# Patient Record
Sex: MALE | Race: BLACK OR AFRICAN AMERICAN | NOT HISPANIC OR LATINO | URBAN - METROPOLITAN AREA
[De-identification: names, ages, dates, MRNs, and addresses within clinical notes are randomized per-mention and may not be internally consistent; named-entity substitution may affect disease eponyms.]

---

## 2017-11-03 ENCOUNTER — EMERGENCY (EMERGENCY)
Facility: HOSPITAL | Age: 62
LOS: 1 days | Discharge: HOME | End: 2017-11-03
Admitting: EMERGENCY MEDICINE
Payer: COMMERCIAL

## 2017-11-03 VITALS
OXYGEN SATURATION: 97 % | TEMPERATURE: 99 F | WEIGHT: 179.9 LBS | HEIGHT: 70 IN | HEART RATE: 90 BPM | DIASTOLIC BLOOD PRESSURE: 94 MMHG | RESPIRATION RATE: 18 BRPM | SYSTOLIC BLOOD PRESSURE: 142 MMHG

## 2017-11-03 DIAGNOSIS — Z98.89 OTHER SPECIFIED POSTPROCEDURAL STATES: Chronic | ICD-10-CM

## 2017-11-03 LAB
ALBUMIN SERPL ELPH-MCNC: 4.8 G/DL — SIGNIFICANT CHANGE UP (ref 3.3–5)
ALP SERPL-CCNC: 63 U/L — SIGNIFICANT CHANGE UP (ref 40–120)
ALT FLD-CCNC: 22 U/L — SIGNIFICANT CHANGE UP (ref 10–45)
ANION GAP SERPL CALC-SCNC: 15 MMOL/L — SIGNIFICANT CHANGE UP (ref 5–17)
ANION GAP SERPL CALC-SCNC: 16 MMOL/L — SIGNIFICANT CHANGE UP (ref 5–17)
APPEARANCE UR: CLEAR — SIGNIFICANT CHANGE UP
APTT BLD: 37.6 SEC — HIGH (ref 27.5–37.4)
AST SERPL-CCNC: 40 U/L — SIGNIFICANT CHANGE UP (ref 10–40)
BASOPHILS NFR BLD AUTO: 0.7 % — SIGNIFICANT CHANGE UP (ref 0–2)
BILIRUB SERPL-MCNC: 0.4 MG/DL — SIGNIFICANT CHANGE UP (ref 0.2–1.2)
BILIRUB UR-MCNC: NEGATIVE — SIGNIFICANT CHANGE UP
BUN SERPL-MCNC: 4 MG/DL — LOW (ref 7–23)
BUN SERPL-MCNC: 4 MG/DL — LOW (ref 7–23)
CALCIUM SERPL-MCNC: 9.6 MG/DL — SIGNIFICANT CHANGE UP (ref 8.4–10.5)
CALCIUM SERPL-MCNC: 9.7 MG/DL — SIGNIFICANT CHANGE UP (ref 8.4–10.5)
CHLORIDE SERPL-SCNC: 85 MMOL/L — LOW (ref 96–108)
CHLORIDE SERPL-SCNC: 86 MMOL/L — LOW (ref 96–108)
CHLORIDE UR-SCNC: 20 MMOL/L — SIGNIFICANT CHANGE UP
CO2 SERPL-SCNC: 24 MMOL/L — SIGNIFICANT CHANGE UP (ref 22–31)
CO2 SERPL-SCNC: 25 MMOL/L — SIGNIFICANT CHANGE UP (ref 22–31)
COLOR SPEC: YELLOW — SIGNIFICANT CHANGE UP
CREAT ?TM UR-MCNC: 21 MG/DL — SIGNIFICANT CHANGE UP
CREAT SERPL-MCNC: 1.07 MG/DL — SIGNIFICANT CHANGE UP (ref 0.5–1.3)
CREAT SERPL-MCNC: 1.08 MG/DL — SIGNIFICANT CHANGE UP (ref 0.5–1.3)
DIFF PNL FLD: (no result)
EOSINOPHIL NFR BLD AUTO: 4.5 % — SIGNIFICANT CHANGE UP (ref 0–6)
GLUCOSE SERPL-MCNC: 103 MG/DL — HIGH (ref 70–99)
GLUCOSE SERPL-MCNC: 71 MG/DL — SIGNIFICANT CHANGE UP (ref 70–99)
GLUCOSE UR QL: NEGATIVE — SIGNIFICANT CHANGE UP
HCT VFR BLD CALC: 34.2 % — LOW (ref 39–50)
HGB BLD-MCNC: 12.5 G/DL — LOW (ref 13–17)
HIV 1+2 AB+HIV1 P24 AG SERPL QL IA: SIGNIFICANT CHANGE UP
INR BLD: 1.07 — SIGNIFICANT CHANGE UP (ref 0.88–1.16)
KETONES UR-MCNC: NEGATIVE — SIGNIFICANT CHANGE UP
LEUKOCYTE ESTERASE UR-ACNC: NEGATIVE — SIGNIFICANT CHANGE UP
LYMPHOCYTES # BLD AUTO: 15.2 % — SIGNIFICANT CHANGE UP (ref 13–44)
MCHC RBC-ENTMCNC: 30 PG — SIGNIFICANT CHANGE UP (ref 27–34)
MCHC RBC-ENTMCNC: 36.5 G/DL — HIGH (ref 32–36)
MCV RBC AUTO: 82.2 FL — SIGNIFICANT CHANGE UP (ref 80–100)
MONOCYTES NFR BLD AUTO: 11.9 % — SIGNIFICANT CHANGE UP (ref 2–14)
NEUTROPHILS NFR BLD AUTO: 67.7 % — SIGNIFICANT CHANGE UP (ref 43–77)
NITRITE UR-MCNC: NEGATIVE — SIGNIFICANT CHANGE UP
OSMOLALITY SERPL: 263 MOSM/KG — LOW (ref 280–301)
OSMOLALITY UR: 42 MOSMOL/KG — LOW (ref 100–650)
PH UR: 6 — SIGNIFICANT CHANGE UP (ref 5–8)
PLATELET # BLD AUTO: 299 K/UL — SIGNIFICANT CHANGE UP (ref 150–400)
POTASSIUM SERPL-MCNC: 4.1 MMOL/L — SIGNIFICANT CHANGE UP (ref 3.5–5.3)
POTASSIUM SERPL-MCNC: 4.7 MMOL/L — SIGNIFICANT CHANGE UP (ref 3.5–5.3)
POTASSIUM SERPL-SCNC: 4.1 MMOL/L — SIGNIFICANT CHANGE UP (ref 3.5–5.3)
POTASSIUM SERPL-SCNC: 4.7 MMOL/L — SIGNIFICANT CHANGE UP (ref 3.5–5.3)
PROT SERPL-MCNC: 8.7 G/DL — HIGH (ref 6–8.3)
PROT UR-MCNC: NEGATIVE MG/DL — SIGNIFICANT CHANGE UP
PROTHROM AB SERPL-ACNC: 11.9 SEC — SIGNIFICANT CHANGE UP (ref 9.8–12.7)
RBC # BLD: 4.16 M/UL — LOW (ref 4.2–5.8)
RBC # FLD: 14.6 % — SIGNIFICANT CHANGE UP (ref 10.3–16.9)
SODIUM SERPL-SCNC: 125 MMOL/L — LOW (ref 135–145)
SODIUM SERPL-SCNC: 126 MMOL/L — LOW (ref 135–145)
SODIUM UR-SCNC: 20 MMOL/L — SIGNIFICANT CHANGE UP
SP GR SPEC: <=1.005 — SIGNIFICANT CHANGE UP (ref 1–1.03)
UROBILINOGEN FLD QL: 0.2 E.U./DL — SIGNIFICANT CHANGE UP
WBC # BLD: 5.7 K/UL — SIGNIFICANT CHANGE UP (ref 3.8–10.5)
WBC # FLD AUTO: 5.7 K/UL — SIGNIFICANT CHANGE UP (ref 3.8–10.5)

## 2017-11-03 PROCEDURE — 80053 COMPREHEN METABOLIC PANEL: CPT

## 2017-11-03 PROCEDURE — 85730 THROMBOPLASTIN TIME PARTIAL: CPT

## 2017-11-03 PROCEDURE — 85025 COMPLETE CBC W/AUTO DIFF WBC: CPT

## 2017-11-03 PROCEDURE — 99284 EMERGENCY DEPT VISIT MOD MDM: CPT | Mod: 25

## 2017-11-03 PROCEDURE — 87389 HIV-1 AG W/HIV-1&-2 AB AG IA: CPT

## 2017-11-03 PROCEDURE — 83935 ASSAY OF URINE OSMOLALITY: CPT

## 2017-11-03 PROCEDURE — 87070 CULTURE OTHR SPECIMN AEROBIC: CPT

## 2017-11-03 PROCEDURE — 81001 URINALYSIS AUTO W/SCOPE: CPT

## 2017-11-03 PROCEDURE — 36415 COLL VENOUS BLD VENIPUNCTURE: CPT

## 2017-11-03 PROCEDURE — 87186 SC STD MICRODIL/AGAR DIL: CPT

## 2017-11-03 PROCEDURE — 82570 ASSAY OF URINE CREATININE: CPT

## 2017-11-03 PROCEDURE — 80048 BASIC METABOLIC PNL TOTAL CA: CPT

## 2017-11-03 PROCEDURE — 83930 ASSAY OF BLOOD OSMOLALITY: CPT

## 2017-11-03 PROCEDURE — 82436 ASSAY OF URINE CHLORIDE: CPT

## 2017-11-03 PROCEDURE — 84300 ASSAY OF URINE SODIUM: CPT

## 2017-11-03 PROCEDURE — 99284 EMERGENCY DEPT VISIT MOD MDM: CPT

## 2017-11-03 PROCEDURE — 85610 PROTHROMBIN TIME: CPT

## 2017-11-03 RX ORDER — AZTREONAM 2 G
1 VIAL (EA) INJECTION
Qty: 14 | Refills: 0 | OUTPATIENT
Start: 2017-11-03 | End: 2017-11-10

## 2017-11-03 NOTE — ED PROVIDER NOTE - PROGRESS NOTE DETAILS
d/w ENT, will come to ED to evaluate ENT evaluating pt now.  Serum Na 125 noted on BMP; discussed with patient.  He says he drinks 8-10 quarts of water daily "to stay well hydrated."  He is asymptomatic (aside from his facial cyst), appears euvolemic, is alert and oriented, and has a nonfocal neuro exam.  DTR are symmetric +2 in biceps, BR, and quadriceps.  Advised that he is probably drinking too much water and needs to cut back to 1- 1.5 L of water daily.  Will help to arrange close follow up with primary care. ED Referral Coordinator trying to arrange close primary care follow up. ED Referral Coordinator arranged primary care appointment for patient with Dr Sesar Call ([facility]) tomorrow at 11 am.  Patient states he will go to this appointment.  I explained at length my concern about his hyponatremia; I discussed the need to limit his free water intake to 1.5 liters/day (which he thinks is too little, but reluctantly agreed to try); I also explained the need to go to the nearest ER if he develops any headaches, weakness, confusion, seizures, or any other new/unusual symptoms.  He expressed understanding with read-back to me.

## 2017-11-03 NOTE — ED PROVIDER NOTE - PHYSICAL EXAMINATION
CONSTITUTIONAL: WD,WN. NAD.    SKIN: Normal color and turgor. No rash.    HEAD: NC/AT.  EYES: Conjunctiva clear. EOMI. PERRL.    ENT: Airway patent, OP clear. Nasal mucosa clear, no rhinorrhea. 2 x 3 cm irregular, mildly tender soft tissue mass with fluctuance anterior to the right earlobe.  No erythema or warmth.  No trismus.  No tenderness of tragus.  No mastoid swelling or tenderness.  No swelling of EAC. TM clear.    RESPIRATORY:  Breathing non-labored. No retractions or accessory muscle use.  Lungs CTA bilat.  CARDIOVASCULAR:  RRR, S1S2. No M/R/G.      GI:  Abdomen soft, nontender.    MSK: Neck supple with painless ROM.  No extremity edema or tenderness.  No joint swelling or ROM limitation.  NEURO: Alert and oriented; AZUL with normal strength.  Normal speech and gait.

## 2017-11-03 NOTE — CONSULT NOTE ADULT - SUBJECTIVE AND OBJECTIVE BOX
HPI: 62y Male with no significant past medical history presenting with mass on the right face that has increased in size over the past 6 weeks. States he has always had a dimple in front of both ears, he has had pus drain from the left side on occasion, always self resolves, has not occurred for years. Has never had an issue on the right side until recently, over the past 5-6 weeks swelling has increased and mass has become fluid filled. Patient denies pain, fevers, chills.     Allergies    No Known Allergies    Intolerances      MEDICATIONS:  Antiinfectives:       Hematologic/Anticoagulation:      Pain medications/Neuro:      IV fluids:      Endocrine Medications:       All other standing medications:       All other PRN medications:      Vital Signs Last 24 Hrs  T(C): 37.1 (03 Nov 2017 14:14), Max: 37.1 (03 Nov 2017 14:14)  T(F): 98.7 (03 Nov 2017 14:14), Max: 98.7 (03 Nov 2017 14:14)  HR: 90 (03 Nov 2017 14:14) (90 - 90)  BP: 142/94 (03 Nov 2017 14:14) (142/94 - 142/94)  BP(mean): --  RR: 18 (03 Nov 2017 14:14) (18 - 18)  SpO2: 97% (03 Nov 2017 14:14) (97% - 97%)    LABS:  CBC-    11-03    125<L>  |  85<L>  |  4<L>  ----------------------------<  103<H>  4.1   |  24  |  1.08    Ca    9.6      03 Nov 2017 15:35      Coagulation Studies-  PT/INR - ( 03 Nov 2017 15:35 )   PT: 11.9 sec;   INR: 1.07          PTT - ( 03 Nov 2017 15:35 )  PTT:37.6 sec  Endocrine Panel-  --  --  9.6 mg/dL        PHYSICAL EXAM:    Constitutional: Well-developed, well-nourished.  No hoarseness.     Head:  normocephalic, atraumatic.   Ears:  Ear canals both clear.  Tympanic membranes both intact; no effusion or retraction.  OC/OP:  Tonsils present. Floor of mouth, buccal mucosa, lips, hard palate, soft palate, uvula, posterior pharyngeal wall normal.  Mucosa moist.  Neck:  Trachea midline.  Thyroid, parotid and submandibular glands normal.  Lymph:  No cervical adenopathy.  Face: Mass anterior to right ear with central dimple, ~2cm, mobile with fluctuant component superiorly. No overlying skin changes. 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   - Discharge on bactrim and augmentin  - Patient not interested in having cysts removed as they do not bother him, does not have insurance and does not want to pay for follow up appointment. Is being set up with a primary care doctor by the ER who he will follow up with. Can follow up with ENT at [place] if interested.   - Return to the ER if experiencing increased pain, drainage, fever, chills       [facility] at [phone] with any further questions.

## 2017-11-03 NOTE — ED PROVIDER NOTE - CARE PLAN
Principal Discharge DX:	Preauricular cyst Principal Discharge DX:	Hyponatremia with normal extracellular fluid volume  Secondary Diagnosis:	Preauricular cyst

## 2017-11-03 NOTE — ED ADULT NURSE NOTE - OBJECTIVE STATEMENT
states abcess for a couple weeks to right cheek.  +edema noted. no active discharge.  denies fever/chills.

## 2017-11-03 NOTE — ED PROVIDER NOTE - MEDICAL DECISION MAKING DETAILS
right sided preauricular cyst, possible abscess.  no surrounding cellulitis.  ENT to evaluate for possible I&D.

## 2017-11-04 LAB — GRAM STN FLD: SIGNIFICANT CHANGE UP

## 2017-11-05 LAB
-  AMPICILLIN/SULBACTAM: SIGNIFICANT CHANGE UP
-  AMPICILLIN: SIGNIFICANT CHANGE UP
-  CEFAZOLIN: SIGNIFICANT CHANGE UP
-  CEFTRIAXONE: SIGNIFICANT CHANGE UP
-  CIPROFLOXACIN: SIGNIFICANT CHANGE UP
-  GENTAMICIN: SIGNIFICANT CHANGE UP
-  PIPERACILLIN/TAZOBACTAM: SIGNIFICANT CHANGE UP
-  TOBRAMYCIN: SIGNIFICANT CHANGE UP
-  TRIMETHOPRIM/SULFAMETHOXAZOLE: SIGNIFICANT CHANGE UP
CULTURE RESULTS: SIGNIFICANT CHANGE UP
METHOD TYPE: SIGNIFICANT CHANGE UP
ORGANISM # SPEC MICROSCOPIC CNT: SIGNIFICANT CHANGE UP
ORGANISM # SPEC MICROSCOPIC CNT: SIGNIFICANT CHANGE UP
SPECIMEN SOURCE: SIGNIFICANT CHANGE UP

## 2017-11-10 DIAGNOSIS — E87.1 HYPO-OSMOLALITY AND HYPONATREMIA: ICD-10-CM

## 2017-11-10 DIAGNOSIS — R22.0 LOCALIZED SWELLING, MASS AND LUMP, HEAD: ICD-10-CM

## 2017-11-10 DIAGNOSIS — Z79.899 OTHER LONG TERM (CURRENT) DRUG THERAPY: ICD-10-CM

## 2017-11-10 DIAGNOSIS — Z79.2 LONG TERM (CURRENT) USE OF ANTIBIOTICS: ICD-10-CM

## 2017-11-10 DIAGNOSIS — Z79.891 LONG TERM (CURRENT) USE OF OPIATE ANALGESIC: ICD-10-CM

## 2017-11-10 DIAGNOSIS — Q18.1 PREAURICULAR SINUS AND CYST: ICD-10-CM

## 2018-12-26 ENCOUNTER — EMERGENCY (EMERGENCY)
Facility: HOSPITAL | Age: 63
LOS: 1 days | Discharge: HOME | End: 2018-12-26
Attending: EMERGENCY MEDICINE | Admitting: EMERGENCY MEDICINE
Payer: COMMERCIAL

## 2018-12-26 VITALS
HEART RATE: 88 BPM | RESPIRATION RATE: 16 BRPM | TEMPERATURE: 99 F | DIASTOLIC BLOOD PRESSURE: 99 MMHG | SYSTOLIC BLOOD PRESSURE: 174 MMHG | WEIGHT: 179.68 LBS | OXYGEN SATURATION: 99 %

## 2018-12-26 DIAGNOSIS — Z79.2 LONG TERM (CURRENT) USE OF ANTIBIOTICS: ICD-10-CM

## 2018-12-26 DIAGNOSIS — Z79.899 OTHER LONG TERM (CURRENT) DRUG THERAPY: ICD-10-CM

## 2018-12-26 DIAGNOSIS — L02.411 CUTANEOUS ABSCESS OF RIGHT AXILLA: ICD-10-CM

## 2018-12-26 DIAGNOSIS — Z98.89 OTHER SPECIFIED POSTPROCEDURAL STATES: Chronic | ICD-10-CM

## 2018-12-26 DIAGNOSIS — Z79.891 LONG TERM (CURRENT) USE OF OPIATE ANALGESIC: ICD-10-CM

## 2018-12-26 DIAGNOSIS — Z79.1 LONG TERM (CURRENT) USE OF NON-STEROIDAL ANTI-INFLAMMATORIES (NSAID): ICD-10-CM

## 2018-12-26 PROCEDURE — 10061 I&D ABSCESS COMP/MULTIPLE: CPT

## 2018-12-26 PROCEDURE — 99283 EMERGENCY DEPT VISIT LOW MDM: CPT | Mod: 25

## 2018-12-26 RX ORDER — AZTREONAM 2 G
1 VIAL (EA) INJECTION
Qty: 14 | Refills: 0 | OUTPATIENT
Start: 2018-12-26 | End: 2019-01-01

## 2018-12-26 RX ORDER — CEPHALEXIN 500 MG
1 CAPSULE ORAL
Qty: 28 | Refills: 0 | OUTPATIENT
Start: 2018-12-26 | End: 2019-01-01

## 2018-12-26 NOTE — ED PROVIDER NOTE - MEDICAL DECISION MAKING DETAILS
64 yo M with no pmh c/o large bump under his R axilla x 3 days. Pt reports a small bump there for a "long time" but over the past 3 days it became much larger. Associated with pain. 62 yo M with no pmh c/o large bump under his R axilla x 3 days. Pt reports a small bump there for a "long time" but over the past 3 days it became much larger. Associated with pain. just below axilla 8x9 cm indurated abscess with large fluctuant middle. skin prepped and abscess drained.

## 2018-12-26 NOTE — ED PROVIDER NOTE - PHYSICAL EXAMINATION
CONSTITUTIONAL: Well-appearing; well-nourished; in no apparent distress.   HEAD: Normocephalic; atraumatic.   NECK: Supple; non-tender;   CARDIOVASCULAR: Normal S1, S2; no murmurs, rubs, or gallops. Regular rate and rhythm.   RESPIRATORY: Breathing easily; breath sounds clear and equal bilaterally; no wheezes, rhonchi, or rales.  MSK: FROM at all extremities, normal tone   EXT: No cyanosis or edema; N/V intact  SKIN: just below axilla CONSTITUTIONAL: Well-appearing; well-nourished; in no apparent distress.   HEAD: Normocephalic; atraumatic.   NECK: Supple; non-tender;   CARDIOVASCULAR: Normal S1, S2; no murmurs, rubs, or gallops. Regular rate and rhythm.   RESPIRATORY: Breathing easily; breath sounds clear and equal bilaterally; no wheezes, rhonchi, or rales.  MSK: FROM at all extremities, normal tone   EXT: No cyanosis or edema; N/V intact  SKIN: just below axilla 8x9 cm indurated abscess with large fluctuant middle

## 2018-12-26 NOTE — ED ADULT TRIAGE NOTE - CHIEF COMPLAINT QUOTE
Pt c/o R axillary "bump releasing blood and pus" x "few months", he denies fevers or chills , been taking Ibuprofen.

## 2018-12-26 NOTE — ED PROVIDER NOTE - NSFOLLOWUPINSTRUCTIONS_ED_ALL_ED_FT
Return to ED in 2 days to have abscess checked. Return to ED sooner for worsening pain, fever, chills or any other concerning symptoms. Take antibiotics as prescribed.     Abscess    An abscess is an infected area that contains a collection of pus and debris. It can occur in almost any part of the body and occurs when the tissue gets infection. Symptoms include a painful mass that is red, warm, tender that might break open and HAVE drainage. If your health care provider gave you antibiotics make sure to take the full course and do not stop even if feeling better.     SEEK IMMEDIATE MEDICAL CARE IF YOU HAVE ANY OF THE FOLLOWING SYMPTOMS: chills, fever, muscle aches, or red streaking from the area.

## 2018-12-26 NOTE — ED PROVIDER NOTE - OBJECTIVE STATEMENT
62 yo M with no pmh c/o large bump under his R axilla x 3 days. Pt reports a small bump there for a "long time" but over the past 3 days it became much larger. Associated with pain. Pt reports occasionally he would squeeze the small bump and pus would come out but he has never had it drained before. Denies fever, chills, cp, sob, IV drug use.

## 2018-12-26 NOTE — ED PROVIDER NOTE - ATTENDING CONTRIBUTION TO CARE
right axillary abscess without systemic symptoms.  very swollen/fluctuant.  I and d done by pa with significant pus extracted.  to f/u with pmd.  antibiotic given for surrounding cellulitic changes

## 2018-12-26 NOTE — ED ADULT NURSE NOTE - NSIMPLEMENTINTERV_GEN_ALL_ED
Implemented All Universal Safety Interventions:  [place] to call system. Call bell, personal items and telephone within reach. Instruct patient to call for assistance. Room bathroom lighting operational. Non-slip footwear when patient is off stretcher. Physically safe environment: no spills, clutter or unnecessary equipment. Stretcher in lowest position, wheels locked, appropriate side rails in place.

## 2018-12-27 ENCOUNTER — EMERGENCY (EMERGENCY)
Facility: HOSPITAL | Age: 63
LOS: 1 days | Discharge: HOME | End: 2018-12-27
Admitting: EMERGENCY MEDICINE
Payer: COMMERCIAL

## 2018-12-27 VITALS
SYSTOLIC BLOOD PRESSURE: 133 MMHG | OXYGEN SATURATION: 95 % | WEIGHT: 179.9 LBS | RESPIRATION RATE: 15 BRPM | HEART RATE: 77 BPM | TEMPERATURE: 98 F | DIASTOLIC BLOOD PRESSURE: 88 MMHG

## 2018-12-27 DIAGNOSIS — Z79.1 LONG TERM (CURRENT) USE OF NON-STEROIDAL ANTI-INFLAMMATORIES (NSAID): ICD-10-CM

## 2018-12-27 DIAGNOSIS — Z79.2 LONG TERM (CURRENT) USE OF ANTIBIOTICS: ICD-10-CM

## 2018-12-27 DIAGNOSIS — Z79.891 LONG TERM (CURRENT) USE OF OPIATE ANALGESIC: ICD-10-CM

## 2018-12-27 DIAGNOSIS — Z79.899 OTHER LONG TERM (CURRENT) DRUG THERAPY: ICD-10-CM

## 2018-12-27 DIAGNOSIS — Z48.01 ENCOUNTER FOR CHANGE OR REMOVAL OF SURGICAL WOUND DRESSING: ICD-10-CM

## 2018-12-27 DIAGNOSIS — Z98.89 OTHER SPECIFIED POSTPROCEDURAL STATES: Chronic | ICD-10-CM

## 2018-12-27 PROCEDURE — 99283 EMERGENCY DEPT VISIT LOW MDM: CPT

## 2018-12-27 RX ORDER — IBUPROFEN 200 MG
1 TABLET ORAL
Qty: 21 | Refills: 0 | OUTPATIENT
Start: 2018-12-27 | End: 2019-01-02

## 2018-12-27 NOTE — ED PROVIDER NOTE - OBJECTIVE STATEMENT
62 yo male in the ER for a wound check. Pt had abscess under his right armpit yesterday and it was drained and packing placed. Pt concerned that his dressing became soaking wet and decided to get checked today. pt reports he takes abx as prescribed. denies fever or chills.

## 2018-12-27 NOTE — ED PROVIDER NOTE - SKIN, MLM
Skin normal color for race, warm, dry and intact. No evidence of rash.  wound under right axilla- packing in place, no active drainage, tender to palpation, no cellulitis,

## 2018-12-27 NOTE — ED ADULT NURSE NOTE - NSIMPLEMENTINTERV_GEN_ALL_ED
Implemented All Universal Safety Interventions:  [place] to call system. Call bell, personal items and telephone within reach. Instruct patient to call for assistance. Room bathroom lighting operational. Non-slip footwear when patient is off stretcher. Physically safe environment: no spills, clutter or unnecessary equipment. Stretcher in lowest position, wheels locked, appropriate side rails in place.

## 2018-12-27 NOTE — ED PROVIDER NOTE - NSFOLLOWUPINSTRUCTIONS_ED_ALL_ED_FT
Take medications as prescribed and keep the area of incision dry, clean. f/u for wound check tomorrow.

## 2018-12-27 NOTE — ED ADULT NURSE NOTE - OBJECTIVE STATEMENT
I and D packing in place with outer dressing off- area red and tender; client taking antibiotics and OTC for pain -- came here for dressing check and re do

## 2018-12-27 NOTE — ED PROVIDER NOTE - MEDICAL DECISION MAKING DETAILS
64 yo 1 days s/p I&D , had abscess under his right axilla drained here yesterday. afebtrile, on PO Bactrim and Keflex. dressing changed, no active drainage at present, packing in place. f/u in 24 hours recommended.

## 2018-12-28 ENCOUNTER — EMERGENCY (EMERGENCY)
Facility: HOSPITAL | Age: 63
LOS: 1 days | Discharge: HOME | End: 2018-12-28
Admitting: EMERGENCY MEDICINE
Payer: COMMERCIAL

## 2018-12-28 VITALS
TEMPERATURE: 98 F | RESPIRATION RATE: 18 BRPM | SYSTOLIC BLOOD PRESSURE: 147 MMHG | WEIGHT: 179.9 LBS | HEART RATE: 80 BPM | DIASTOLIC BLOOD PRESSURE: 80 MMHG | OXYGEN SATURATION: 98 %

## 2018-12-28 DIAGNOSIS — Z98.89 OTHER SPECIFIED POSTPROCEDURAL STATES: Chronic | ICD-10-CM

## 2018-12-28 PROCEDURE — 99282 EMERGENCY DEPT VISIT SF MDM: CPT

## 2018-12-28 NOTE — ED PROVIDER NOTE - MEDICAL DECISION MAKING DETAILS
64 yo M with no pmh here for wound check. Abscess to R axilla drained 2 days ago. Pt reports pain has improved. Denies fever, chills. Pt taking bactrim and keflex as directed. R axilla- healing abscess, slight drainage. no surrounding erythema or warmth. Dressing changed. Will return in 2 days for a wound check.

## 2018-12-28 NOTE — ED PROVIDER NOTE - OBJECTIVE STATEMENT
64 yo M with no pmh here for wound check. Abscess to R axilla drained 2 days ago. Pt reports pain has improved. Denies fever, chills. Pt taking bactrim and keflex as directed.

## 2018-12-28 NOTE — ED PROVIDER NOTE - NSFOLLOWUPINSTRUCTIONS_ED_ALL_ED_FT
Return to ED in 2 days for a wound check.       Abscess    An abscess is an infected area that contains a collection of pus and debris. It can occur in almost any part of the body and occurs when the tissue gets infection. Symptoms include a painful mass that is red, warm, tender that might break open and HAVE drainage. If your health care provider gave you antibiotics make sure to take the full course and do not stop even if feeling better.     SEEK IMMEDIATE MEDICAL CARE IF YOU HAVE ANY OF THE FOLLOWING SYMPTOMS: chills, fever, muscle aches, or red streaking from the area.

## 2018-12-28 NOTE — ED ADULT NURSE NOTE - NSIMPLEMENTINTERV_GEN_ALL_ED
Implemented All Universal Safety Interventions:  [place] to call system. Call bell, personal items and telephone within reach. Instruct patient to call for assistance. Room bathroom lighting operational. Non-slip footwear when patient is off stretcher. Physically safe environment: no spills, clutter or unnecessary equipment. Stretcher in lowest position, wheels locked, appropriate side rails in place.

## 2018-12-28 NOTE — ED ADULT TRIAGE NOTE - OTHER COMPLAINTS
reports wound was bleeding yesterday, came back for dressing change. was told to come today for wound check. denies drainage, fevers, pain.

## 2018-12-28 NOTE — ED PROVIDER NOTE - PHYSICAL EXAMINATION
CONSTITUTIONAL: Well-appearing; well-nourished; in no apparent distress.   HEAD: Normocephalic; atraumatic.   NECK: Supple; non-tender;   CARDIOVASCULAR: Normal S1, S2; no murmurs, rubs, or gallops. Regular rate and rhythm.   RESPIRATORY: Breathing easily; breath sounds clear and equal bilaterally; no wheezes, rhonchi, or rales.  SKIN: R axilla- healing abscess, slight drainage. no surrounding erythema or warmth

## 2018-12-28 NOTE — ED ADULT NURSE NOTE - OBJECTIVE STATEMENT
Pt was told to come to ED for wound check, denies wound is getting worse, denies increased swelling, chills, fever.

## 2018-12-30 ENCOUNTER — EMERGENCY (EMERGENCY)
Facility: HOSPITAL | Age: 63
LOS: 1 days | Discharge: HOME | End: 2018-12-30
Attending: EMERGENCY MEDICINE | Admitting: EMERGENCY MEDICINE
Payer: COMMERCIAL

## 2018-12-30 VITALS
SYSTOLIC BLOOD PRESSURE: 154 MMHG | WEIGHT: 179.9 LBS | DIASTOLIC BLOOD PRESSURE: 93 MMHG | RESPIRATION RATE: 18 BRPM | TEMPERATURE: 98 F | HEART RATE: 92 BPM | OXYGEN SATURATION: 99 %

## 2018-12-30 DIAGNOSIS — Z48.01 ENCOUNTER FOR CHANGE OR REMOVAL OF SURGICAL WOUND DRESSING: ICD-10-CM

## 2018-12-30 DIAGNOSIS — Z79.1 LONG TERM (CURRENT) USE OF NON-STEROIDAL ANTI-INFLAMMATORIES (NSAID): ICD-10-CM

## 2018-12-30 DIAGNOSIS — Z79.2 LONG TERM (CURRENT) USE OF ANTIBIOTICS: ICD-10-CM

## 2018-12-30 DIAGNOSIS — L02.411 CUTANEOUS ABSCESS OF RIGHT AXILLA: ICD-10-CM

## 2018-12-30 DIAGNOSIS — Z79.891 LONG TERM (CURRENT) USE OF OPIATE ANALGESIC: ICD-10-CM

## 2018-12-30 DIAGNOSIS — Z79.899 OTHER LONG TERM (CURRENT) DRUG THERAPY: ICD-10-CM

## 2018-12-30 DIAGNOSIS — Z98.89 OTHER SPECIFIED POSTPROCEDURAL STATES: Chronic | ICD-10-CM

## 2018-12-30 PROBLEM — L02.91 CUTANEOUS ABSCESS, UNSPECIFIED: Chronic | Status: ACTIVE | Noted: 2018-12-27

## 2018-12-30 PROCEDURE — 99282 EMERGENCY DEPT VISIT SF MDM: CPT

## 2018-12-30 NOTE — ED ADULT NURSE NOTE - LOCATION
likely 2/2 obstructive uropathy 2/2 underlying BPH (patient started on Cardura on previous admission). On presentation, creatinine elevated to 8.80 (baseline creatinine 1.0 as seen on previous labs). Bladder scan revealing of retained urine. Rodriguez catheter placed and patient with initial U/O 1.7L, total 2.5L. CT scan significant for bilateral hydronephrosis. Fluid resuscitation for post-obstructive diuresis discontinued on 6/9 after urine osmolality normalized   - c/w Flomax 0.8mg QHS  - monitor strict I/Os   - urology consulted, appreciate recs   - will likely need chronic indwelling rodriguez likely 2/2 obstructive uropathy 2/2 underlying BPH (patient started on Cardura on previous admission). On presentation, creatinine elevated to 8.80 (baseline creatinine 1.0 as seen on previous labs). Bladder scan revealing of retained urine. Rodriguez catheter placed and patient with initial U/O 1.7L, total 2.5L. CT scan significant for bilateral hydronephrosis. Fluid resuscitation for post-obstructive diuresis discontinued on 6/9 after urine osmolality normalized   - c/w Flomax 0.8mg QHS  - monitor strict I/Os   - urology consulted, appreciate recs   - will likely need chronic indwelling rodriguez likely 2/2 obstructive uropathy 2/2 underlying BPH (patient started on Cardura on previous admission). On presentation, creatinine elevated to 8.80 (baseline creatinine 1.0 as seen on previous labs). Bladder scan revealing of retained urine. Rodriguez catheter placed and patient with initial U/O 1.7L, total 2.5L. CT scan significant for bilateral hydronephrosis. Fluid resuscitation for post-obstructive diuresis discontinued on 6/9 after urine osmolality normalized   - c/w Flomax 0.8mg QHS  - monitor strict I/Os   - urology consulted, appreciate recs   - will likely need chronic indwelling rodriguez likely 2/2 obstructive uropathy 2/2 underlying BPH (patient started on Cardura on previous admission). On presentation, creatinine elevated to 8.80 (baseline creatinine 1.0 as seen on previous labs). Bladder scan revealing of retained urine. Rodriguez catheter placed and patient with initial U/O 1.7L, total 2.5L. CT scan significant for bilateral hydronephrosis. Fluid resuscitation for post-obstructive diuresis discontinued on 6/9 after urine osmolality normalized   - c/w Flomax 0.8mg QHS  - monitor strict I/Os   - urology consulted, appreciate recs - will be discharged on rodriguez and with follow up with urology. Will most likely need TURP. likely 2/2 obstructive uropathy 2/2 underlying BPH (patient started on Cardura on previous admission). On presentation, creatinine elevated to 8.80 (baseline creatinine 1.0 as seen on previous labs). Bladder scan revealing of retained urine. Rodriguez catheter placed and patient with initial U/O 1.7L, total 2.5L. CT scan significant for bilateral hydronephrosis. Fluid resuscitation for post-obstructive diuresis discontinued on 6/9 after urine osmolality normalized   - c/w Flomax 0.8mg QHS  - monitor strict I/Os   - urology consulted, appreciate recs   - will likely need chronic indwelling rodriguez right armpit

## 2018-12-30 NOTE — ED PROVIDER NOTE - NSFOLLOWUPINSTRUCTIONS_ED_ALL_ED_FT
Please see you primary care provider in 24-48 hours.  Return to the ER if symptoms worsen or other concerns.  Return to the ER for increased redness/swelling/pain/fever.  Else complete your antibiotics as prescribed.    Abscess    An abscess is an infected area that contains a collection of pus and debris. It can occur in almost any part of the body and occurs when the tissue gets infection. Symptoms include a painful mass that is red, warm, tender that might break open and HAVE drainage. If your health care provider gave you antibiotics make sure to take the full course and do not stop even if feeling better.     SEEK IMMEDIATE MEDICAL CARE IF YOU HAVE ANY OF THE FOLLOWING SYMPTOMS: chills, fever, muscle aches, or red streaking from the area.

## 2018-12-30 NOTE — ED PROVIDER NOTE - OBJECTIVE STATEMENT
here for wound check of abscess.  States feeling better.  Still with some swelling. Denies pain or fever or drainage

## 2018-12-30 NOTE — ED PROVIDER NOTE - SKIN, MLM
right armpit with persistent induration laterally.  blood but no pus extracted from wound.  no surrounding warmth/ tenderness/ erythema

## 2018-12-30 NOTE — ED ADULT NURSE NOTE - OBJECTIVE STATEMENT
s/p I & D procedure abscess right armpit Dec. 26, returns for wound check, states pain w/ pus and some blood drainage from wound site.  No fevers.

## 2018-12-30 NOTE — ED PROVIDER NOTE - NSFOLLOWUPCLINICS_GEN_ALL_ED_FT
[facility] [address] [place] [address]  Family Medicine  [address] [place] [address]  Phone: [phone]  Fax: [phone]  Follow Up Time:

## 2018-12-30 NOTE — ED ADULT NURSE NOTE - NSIMPLEMENTINTERV_GEN_ALL_ED
Implemented All Universal Safety Interventions:  [place] to call system. Call bell, personal items and telephone within reach. Instruct patient to call for assistance. Room bathroom lighting operational. Non-slip footwear when patient is off stretcher. Physically safe environment: no spills, clutter or unnecessary equipment. Stretcher in lowest position, wheels locked, appropriate side rails in place.

## 2019-01-01 DIAGNOSIS — Z79.1 LONG TERM (CURRENT) USE OF NON-STEROIDAL ANTI-INFLAMMATORIES (NSAID): ICD-10-CM

## 2019-01-01 DIAGNOSIS — Z79.891 LONG TERM (CURRENT) USE OF OPIATE ANALGESIC: ICD-10-CM

## 2019-01-01 DIAGNOSIS — L02.411 CUTANEOUS ABSCESS OF RIGHT AXILLA: ICD-10-CM

## 2019-01-01 DIAGNOSIS — Z79.2 LONG TERM (CURRENT) USE OF ANTIBIOTICS: ICD-10-CM

## 2019-01-01 DIAGNOSIS — Z79.899 OTHER LONG TERM (CURRENT) DRUG THERAPY: ICD-10-CM

## 2019-02-07 ENCOUNTER — EMERGENCY (EMERGENCY)
Facility: HOSPITAL | Age: 64
LOS: 1 days | Discharge: HOME | End: 2019-02-07
Admitting: EMERGENCY MEDICINE
Payer: COMMERCIAL

## 2019-02-07 VITALS
HEART RATE: 75 BPM | WEIGHT: 179.9 LBS | OXYGEN SATURATION: 98 % | DIASTOLIC BLOOD PRESSURE: 95 MMHG | RESPIRATION RATE: 16 BRPM | SYSTOLIC BLOOD PRESSURE: 144 MMHG | TEMPERATURE: 98 F

## 2019-02-07 DIAGNOSIS — Z79.4 LONG TERM (CURRENT) USE OF INSULIN: ICD-10-CM

## 2019-02-07 DIAGNOSIS — Z98.89 OTHER SPECIFIED POSTPROCEDURAL STATES: Chronic | ICD-10-CM

## 2019-02-07 DIAGNOSIS — Z79.2 LONG TERM (CURRENT) USE OF ANTIBIOTICS: ICD-10-CM

## 2019-02-07 DIAGNOSIS — Z79.899 OTHER LONG TERM (CURRENT) DRUG THERAPY: ICD-10-CM

## 2019-02-07 DIAGNOSIS — Z79.1 LONG TERM (CURRENT) USE OF NON-STEROIDAL ANTI-INFLAMMATORIES (NSAID): ICD-10-CM

## 2019-02-07 DIAGNOSIS — L02.411 CUTANEOUS ABSCESS OF RIGHT AXILLA: ICD-10-CM

## 2019-02-07 PROCEDURE — 99283 EMERGENCY DEPT VISIT LOW MDM: CPT | Mod: 25

## 2019-02-07 PROCEDURE — 10061 I&D ABSCESS COMP/MULTIPLE: CPT

## 2019-02-07 RX ORDER — IBUPROFEN 200 MG
800 TABLET ORAL ONCE
Qty: 0 | Refills: 0 | Status: COMPLETED | OUTPATIENT
Start: 2019-02-07 | End: 2019-02-07

## 2019-02-07 RX ORDER — IBUPROFEN 200 MG
1 TABLET ORAL
Qty: 30 | Refills: 0 | OUTPATIENT
Start: 2019-02-07 | End: 2019-02-16

## 2019-02-07 RX ADMIN — Medication 800 MILLIGRAM(S): at 14:22

## 2019-02-07 NOTE — ED PROVIDER NOTE - OBJECTIVE STATEMENT
62 yo male in the ER c/o painful abscess in his right armpit. pt noted slight swelling about 4 days ago. he tried warm compresses, but abscess just increased in size and became more painful. pt states he wants it to be drained. Pt reports similar in the past multiple times. Denies fever or chills.

## 2019-02-07 NOTE — ED ADULT NURSE NOTE - OBJECTIVE STATEMENT
63 yr old male patient with c/o of R axilla pain & swelling x1 day. States "I had an abscess there".  Denies n/v/d/f/chills.

## 2019-02-07 NOTE — ED PROVIDER NOTE - NSFOLLOWUPINSTRUCTIONS_ED_ALL_ED_FT
I have discussed the discharge plan with the patient. The patient agrees with the plan, as discussed.  The patient understands Emergency Department diagnosis is a preliminary diagnosis often based on limited information and that the patient must adhere to the follow-up plan as discussed.  The patient understands that if the symptoms worsen or if prescribed medications do not have the desired/planned effect that the patient may return to the Emergency Department at any time for further evaluation and treatment.      Incision and Drainage  Incision and drainage is a surgical procedure to open and drain a fluid-filled sac. The sac may be filled with pus, mucus, or blood. Examples of fluid-filled sacs that may need surgical drainage include cysts, skin infections (abscesses), and red lumps that develop from a ruptured cyst or a small abscess (boils).    You may need this procedure if the affected area is large, painful, infected, or not healing well.    Tell a health care provider about:  Any allergies you have.  All medicines you are taking, including vitamins, herbs, eye drops, creams, and over-the-counter medicines.  Any problems you or family members have had with anesthetic medicines.  Any blood disorders you have.  Any surgeries you have had.  Any medical conditions you have.  Whether you are pregnant or may be pregnant.  What are the risks?  Generally, this is a safe procedure. However, problems may occur, including:    Infection.  Bleeding.  Allergic reactions to medicines.  Scarring.    What happens before the procedure?  You may need an ultrasound or other imaging tests to see how large or deep the fluid-filled sac is.  You may have blood tests to check for infection.  You may get a tetanus shot.  You may be given antibiotic medicine to help prevent infection.  Follow instructions from your health care provider about eating or drinking restrictions.  Ask your health care provider about:    Changing or stopping your regular medicines. This is especially important if you are taking diabetes medicines or blood thinners.  Taking medicines such as aspirin and ibuprofen. These medicines can thin your blood. Do not take these medicines before your procedure if your health care provider instructs you not to.    Plan to have someone take you home after the procedure.  If you will be going home right after the procedure, plan to have someone stay with you for 24 hours.  What happens during the procedure?  To reduce your risk of infection:    Your health care team will wash or sanitize their hands.  Your skin will be washed with soap.    You will be given one or more of the following:    A medicine to help you relax (sedative).  A medicine to numb the area (local anesthetic).  A medicine to make you fall asleep (general anesthetic).    An incision will be made in the top of the fluid-filled sac.  The contents of the sac may be squeezed out, or a syringe or tube (catheter) may be used to empty the sac.  The catheter may be left in place for several weeks to drain any fluid. Or, your health care provider may stitch open the edges of the incision to make a long-term opening for drainage (marsupialization).  The inside of the sac may be washed out (irrigated) with a sterile solution and packed with gauze before it is covered with a bandage (dressing).  The procedure may vary among health care providers and hospitals.    What happens after the procedure?  Your blood pressure, heart rate, breathing rate, and blood oxygen level will be monitored often until the medicines you were given have worn off.  Do not drive for 24 hours if you received a sedative.  This information is not intended to replace advice given to you by your health care provider. Make sure you discuss any questions you have with your health care provider.             ABSCESS INCISION AND DRAINAGE - Discharge Care     Abscess Incision and Drainage    WHAT YOU NEED TO KNOW:    An abscess incision and drainage (I and D) is a procedure to drain pus from an abscess and clean it out so it can heal.    DISCHARGE INSTRUCTIONS:    Contact your healthcare provider if:     The area around your abscess has red streaks or is warm and painful.       You have a fever or chills.       You have increased redness, swelling, or pain in your wound.      Your wound does not start to heal after a few days.      Your abscess returns.       You have questions or concerns about your condition or care.    Medicines:     NSAIDs, such as ibuprofen, help decrease swelling, pain, and fever. NSAIDs can cause stomach bleeding or kidney problems in certain people. If you take blood thinner medicine, always ask your healthcare provider if NSAIDs are safe for you. Always read the medicine label and follow directions.      Take your medicine as directed. Contact your healthcare provider if you think your medicine is not helping or if you have side effects. Tell him or her if you are allergic to any medicine. Keep a list of the medicines, vitamins, and herbs you take. Include the amounts, and when and why you take them. Bring the list or the pill bottles to follow-up visits. Carry your medicine list with you in case of an emergency.    Care for your wound as directed:     Do not remove your bandage unless your healthcare provider says it is okay. Keep the bandage clean and dry. Remove your bandage and clean the wound once your healthcare provider gives you directions.       Apply heat on the bandage over your wound for 20 to 30 minutes every 2 hours for as many days as directed. This will increase blood flow to the area and help it heal.      Elevate your wound above level of your heart as often as you can. This will help decrease swelling and pain. Prop your wounded area on pillows or blankets to keep it elevated comfortably.    Follow up with your healthcare provider as directed: You may need to return in 1 to 3 days to have the gauze in your wound removed and your wound examined. You may be taught how to change the gauze in your wound. Write down your questions so you remember to ask them during your visits.

## 2019-02-07 NOTE — ED ADULT NURSE NOTE - NSIMPLEMENTINTERV_GEN_ALL_ED
Implemented All Universal Safety Interventions:  [place] to call system. Call bell, personal items and telephone within reach. Instruct patient to call for assistance. Room bathroom lighting operational. Non-slip footwear when patient is off stretcher. Physically safe environment: no spills, clutter or unnecessary equipment. Stretcher in lowest position, wheels locked, appropriate side rails in place.

## 2019-02-07 NOTE — ED PROVIDER NOTE - CLINICAL SUMMARY MEDICAL DECISION MAKING FREE TEXT BOX
52 yo male with recurrent abscess to right axilla. Pt afebrile, in NAD. I&D done and packing placed. Will d/c home with PO abx and recommend wound check in 48 hours.

## 2019-02-07 NOTE — ED PROVIDER NOTE - SKIN, MLM
Skin normal color for race, warm, dry and intact. No evidence of rash.  5 cm indurated, fluctuant, tender abscess to right axilla, no signs of cellulitis

## 2019-02-07 NOTE — ED ADULT TRIAGE NOTE - CHIEF COMPLAINT QUOTE
Pt presents to ED with c/o RT axillary swelling since Monday , denies fevers or chills. Hx of prev abscess in this area.

## 2019-02-09 ENCOUNTER — EMERGENCY (EMERGENCY)
Facility: HOSPITAL | Age: 64
LOS: 1 days | Discharge: HOME | End: 2019-02-09
Admitting: EMERGENCY MEDICINE
Payer: COMMERCIAL

## 2019-02-09 VITALS
HEART RATE: 76 BPM | TEMPERATURE: 98 F | RESPIRATION RATE: 17 BRPM | OXYGEN SATURATION: 99 % | SYSTOLIC BLOOD PRESSURE: 160 MMHG | WEIGHT: 184.97 LBS | DIASTOLIC BLOOD PRESSURE: 89 MMHG

## 2019-02-09 DIAGNOSIS — Z48.01 ENCOUNTER FOR CHANGE OR REMOVAL OF SURGICAL WOUND DRESSING: ICD-10-CM

## 2019-02-09 DIAGNOSIS — Z98.89 OTHER SPECIFIED POSTPROCEDURAL STATES: Chronic | ICD-10-CM

## 2019-02-09 NOTE — ED PROVIDER NOTE - NSFOLLOWUPINSTRUCTIONS_ED_ALL_ED_FT
Hidradenitis Suppurativa  Hidradenitis suppurativa is a long-term (chronic) skin disease that starts with blocked sweat glands or hair follicles. Bacteria may grow in these blocked openings of your skin. Hidradenitis suppurativa is like a severe form of acne that develops in areas of your body where acne would be unusual. It is most likely to affect the areas of your body where skin rubs against skin and becomes moist. This includes your:    Underarms.  Groin.  Genital areas.  Buttocks.  Upper thighs.  Breasts.    Hidradenitis suppurativa may start out with small pimples. The pimples can develop into deep sores that break open (rupture) and drain pus. Over time your skin may thicken and become scarred. Hidradenitis suppurativa cannot be passed from person to person.    What are the causes?  The exact cause of hidradenitis suppurativa is not known. This condition may be due to:    Male and female hormones. The condition is rare before and after puberty.  An overactive body defense system (immune system). Your immune system may overreact to the blocked hair follicles or sweat glands and cause swelling and pus-filled sores.    What increases the risk?  You may have a higher risk of hidradenitis suppurativa if you:    Are a woman.  Are between ages 11 and 55.  Have a family history of hidradenitis suppurativa.  Have a personal history of acne.  Are overweight.  Smoke.  Take the drug lithium.    What are the signs or symptoms?  The first signs of an outbreak are usually painful skin bumps that look like pimples. As the condition progresses:    Skin bumps may get bigger and grow deeper into the skin.  Bumps under the skin may rupture and drain smelly pus.  Skin may become itchy and infected.  Skin may thicken and scar.  Drainage may continue through tunnels under the skin (fistulas).  Walking and moving your arms can become painful.    How is this diagnosed?  Your health care provider may diagnose hidradenitis suppurativa based on your medical history and your signs and symptoms. A physical exam will also be done. You may need to see a health care provider who specializes in skin diseases (dermatologist). You may also have tests done to confirm the diagnosis. These can include:    Swabbing a sample of pus or drainage from your skin so it can be sent to the lab and tested for infection.  Blood tests to check for infection.    How is this treated?  The same treatment will not work for everybody with hidradenitis suppurativa. Your treatment will depend on how severe your symptoms are. You may need to try several treatments to find what works best for you. Part of your treatment may include cleaning and bandaging (dressing) your wounds. You may also have to take medicines, such as the following:    Antibiotics.  Acne medicines.  Medicines to block or suppress the immune system.  A diabetes medicine (metformin) is sometimes used to treat this condition.  For women, birth control pills can sometimes help relieve symptoms.    You may need surgery if you have a severe case of hidradenitis suppurativa that does not respond to medicine. Surgery may involve:    Using a laser to clear the skin and remove hair follicles.  Opening and draining deep sores.  Removing the areas of skin that are diseased and scarred.    Follow these instructions at home:  Learn as much as you can about your disease, and work closely with your health care providers.  Take medicines only as directed by your health care provider.  If you were prescribed an antibiotic medicine, finish it all even if you start to feel better.  If you are overweight, losing weight may be very helpful. Try to reach and maintain a healthy weight.  Do not use any tobacco products, including cigarettes, chewing tobacco, or electronic cigarettes. If you need help quitting, ask your health care provider.  Do not shave the areas where you get hidradenitis suppurativa.  Do not wear deodorant.  Wear loose-fitting clothes.  Try not to overheat and get sweaty.  Take a daily bleach bath as directed by your health care provider.    Fill your bathtub group home with water.  Pour in ½ cup of unscented household bleach.  Soak for 5–10 minutes.    Cover sore areas with a warm, clean washcloth (compress) for 5–10 minutes.  Contact a health care provider if:  You have a flare-up of hidradenitis suppurativa.  You have chills or a fever.  You are having trouble controlling your symptoms at home.  This information is not intended to replace advice given to you by your health care provider. Make sure you discuss any questions you have with your health care provider.

## 2019-02-09 NOTE — ED ADULT NURSE NOTE - CHIEF COMPLAINT QUOTE
Patient states he was here on Thursday and had an abscess drained from his right armpit.  Patient denies any fevers, chills, N/V or any other complaints at this time.

## 2019-02-09 NOTE — ED PROVIDER NOTE - CLINICAL SUMMARY MEDICAL DECISION MAKING FREE TEXT BOX
62 yo male in the ER for a wound check. Pt has h/o hidradenitis suppurativa. I&D was done 2 days ago for right axillary abscess. wound checked- packing removed and wound irrigated with NS, no purulent drainage. healing well. no signs of cellulitis. pt ready for discharge. will finish abx as prescribed.

## 2019-02-09 NOTE — ED ADULT NURSE NOTE - OBJECTIVE STATEMENT
Patient states he was here on Thursday and had an abscess drained from his right armpit.  Patient denies any pain, drainage, fevers, chills, N/V or any other complaints at this time.

## 2019-02-09 NOTE — ED PROVIDER NOTE - OBJECTIVE STATEMENT
64 yo male in the ER for a wound check. Pt had I&D done 2 days ago-right axillary abscess was drained and packing placed.  Pt denies f/c, reports pain subsided.

## 2019-02-09 NOTE — ED PROVIDER NOTE - SKIN, MLM
Skin normal color for race, warm, dry and intact. No evidence of rash.  no evidence of cellulitis to right axilla,

## 2019-02-18 PROCEDURE — 99212 OFFICE O/P EST SF 10 MIN: CPT

## 2019-05-28 NOTE — ED ADULT NURSE NOTE - CHPI ED NUR SYMPTOMS NEG
"Patient: Emilie CR Soto    Procedure Summary     Date:  05/28/19 Room / Location:  [facility] OR  / [facility] MAIN OR    Anesthesia Start:  0754 Anesthesia Stop:  1054    Procedure:  Right central partial mastectomy (with removal of the nipple-areola-complex) and sentinel lymph node biopsy (Right Breast) Diagnosis:       Malignant neoplasm of central portion of right breast in female, estrogen receptor positive (CMS/HCC)      (Malignant neoplasm of central portion of right breast in female, estrogen receptor positive (CMS/HCC) [C50.111, Z17.0])    Surgeon:  Trena Greene MD Provider:  Abilio Abbott MD    Anesthesia Type:  general ASA Status:  3          Anesthesia Type: general  Last vitals  BP   142/80 (05/28/19 1215)   Temp   37 °C (98.6 °F) (05/28/19 1049)   Pulse   87 (05/28/19 1215)   Resp   16 (05/28/19 1215)     SpO2   94 % (05/28/19 1215)     Post Anesthesia Care and Evaluation    Patient location during evaluation: PACU  Patient participation: complete - patient participated  Level of consciousness: awake and alert  Pain score: 0  Pain management: adequate  Airway patency: patent  Anesthetic complications: No anesthetic complications    Cardiovascular status: acceptable  Respiratory status: acceptable  Hydration status: acceptable    Comments: /80   Pulse 87   Temp 37 °C (98.6 °F) (Oral)   Resp 16   Ht 167.6 cm (66\")   Wt (!) 137 kg (302 lb 8 oz)   LMP 05/19/2019 (Exact Date)   SpO2 94%   BMI 48.82 kg/m²       "
no fever

## 2019-06-28 ENCOUNTER — EMERGENCY (EMERGENCY)
Facility: HOSPITAL | Age: 64
LOS: 1 days | Discharge: HOME | End: 2019-06-28
Admitting: EMERGENCY MEDICINE
Payer: COMMERCIAL

## 2019-06-28 VITALS
HEIGHT: 70 IN | RESPIRATION RATE: 18 BRPM | OXYGEN SATURATION: 96 % | SYSTOLIC BLOOD PRESSURE: 148 MMHG | DIASTOLIC BLOOD PRESSURE: 86 MMHG | TEMPERATURE: 99 F | HEART RATE: 87 BPM | WEIGHT: 178.13 LBS

## 2019-06-28 DIAGNOSIS — Z98.89 OTHER SPECIFIED POSTPROCEDURAL STATES: Chronic | ICD-10-CM

## 2019-06-28 DIAGNOSIS — M70.22 OLECRANON BURSITIS, LEFT ELBOW: ICD-10-CM

## 2019-06-28 DIAGNOSIS — M25.522 PAIN IN LEFT ELBOW: ICD-10-CM

## 2019-06-28 PROCEDURE — 73080 X-RAY EXAM OF ELBOW: CPT | Mod: 26

## 2019-06-28 PROCEDURE — 99283 EMERGENCY DEPT VISIT LOW MDM: CPT | Mod: 25

## 2019-06-28 PROCEDURE — 73080 X-RAY EXAM OF ELBOW: CPT

## 2019-06-28 PROCEDURE — 73080 X-RAY EXAM OF ELBOW: CPT | Mod: 26,LT

## 2019-06-28 PROCEDURE — 99283 EMERGENCY DEPT VISIT LOW MDM: CPT

## 2019-06-28 RX ORDER — IBUPROFEN 200 MG
800 TABLET ORAL ONCE
Refills: 0 | Status: COMPLETED | OUTPATIENT
Start: 2019-06-28 | End: 2019-06-28

## 2019-06-28 RX ADMIN — Medication 800 MILLIGRAM(S): at 13:15

## 2019-06-28 NOTE — ED PROVIDER NOTE - CLINICAL SUMMARY MEDICAL DECISION MAKING FREE TEXT BOX
64y/o M with no significant PMHx presents to the ED with left elbow swelling for 3 months. The pain began 3 weeks ago. Patient denies fever, trauma, and chills. Vital signs are normal. On exam, left elbow had positive large soft swelling over olecranon but non-tender, no erythema, no warmth, and FROM. Exam is consistent with bursitis, and no concern of septic joint or abscess. Will refer to ortho. 62y/o M with no significant PMHx presents to the ED with left elbow swelling for 3 months. The pain began 3 weeks ago. Patient denies fever, trauma, and chills. Vital signs are normal. On exam, left elbow had positive large soft swelling over olecranon but non-tender, no erythema, no warmth, and FROM. Exam is consistent with bursitis, and no concern of septic joint or abscess. Xray  +soft tissue swelling noted over olecrenon. Ace bandage applied.  Will refer to ortho.

## 2019-06-28 NOTE — ED PROVIDER NOTE - NSFOLLOWUPINSTRUCTIONS_ED_ALL_ED_FT
Elbow Bursitis    WHAT YOU NEED TO KNOW:    Elbow bursitis is inflammation of the bursa in your elbow. The bursa is a fluid-filled sac that acts as a cushion between a bone and a tendon. A tendon is a cord of strong tissue that connects muscles to bones. The bursa is located right under the point of your elbow.    DISCHARGE INSTRUCTIONS:    Medicines:     NSAIDs: These medicines decrease swelling, pain, and fever. NSAIDs are available without a doctor's order. Ask your healthcare provider which medicine is right for you. Ask how much to take and when to take it. Take as directed. NSAIDs can cause stomach bleeding and kidney problems if not taken correctly.      Antibiotics: These help fight an infection caused by bacteria. You may need antibiotics if your bursitis is caused by infection.       Take your medicine as directed. Contact your healthcare provider if you think your medicine is not helping or if you have side effects. Tell him of her if you are allergic to any medicine. Keep a list of the medicines, vitamins, and herbs you take. Include the amounts, and when and why you take them. Bring the list or the pill bottles to follow-up visits. Carry your medicine list with you in case of an emergency.    Manage your symptoms:     Rest: Rest your elbow as much as possible to decrease pain and swelling. Slowly start to do more each day. Return to your daily activities as directed.       Ice: Ice helps decrease swelling and pain. Ice may also help prevent tissue damage. Use an ice pack, or put crushed ice in a plastic bag. Cover it with a towel and place it on your elbow for 15 to 20 minutes, 3 to 4 times each day, as directed.      Compress: Healthcare providers may wrap your arm with tape or an elastic bandage to decrease swelling. Loosen the elastic bandage if you start to lose feeling in your fingers.      Elevate: Raise your elbow above the level of your heart as often as you can. This will help decrease swelling and pain. Prop your elbow on pillows or blankets to keep it elevated comfortably.     Physical therapy: A physical therapist teaches you exercises to help improve movement and strength, and to decrease pain.     Prevent another elbow injury:     Avoid injury and pressure to your elbows: Wear elbow pads or protectors when you play sports. Do not lean on your elbows or clench your fists. Do not tightly  small items, such as tools or pens.       Stretch, warm up, and cool down: Always stretch and do warmup and cool-down exercises before and after you exercise. This will help loosen your muscles and decrease stress on your elbow. Rest between workouts.    Follow up with your healthcare provider as directed: Write down your questions so you remember to ask them during your visits.     Contact your healthcare provider if:     Your pain and swelling increase.      Your symptoms do not improve after 10 days of treatment.      You have a fever.      You have questions or concerns about your condition or care.

## 2019-06-28 NOTE — ED PROVIDER NOTE - CARE PROVIDER_API CALL
Jalen Matias)  Orthopaedic Surgery  [address] [place] [address]  Phone: [phone]  Fax: [phone]  Follow Up Time:     Negro Barry)  Orthopaedic Surgery  [address] [place] [id number]  Phone: [phone]  Fax: [phone]  Follow Up Time:

## 2019-06-28 NOTE — ED ADULT NURSE NOTE - OBJECTIVE STATEMENT
Patient alert and oriented x 3 came c/o left elbow swelling ( bump ) since april but for the past 3 weeks is worsening , and pain is increased . Took motrin 600 mg at 9:30am today with no relief .  Denies any injury .

## 2019-06-28 NOTE — ED PROVIDER NOTE - PHYSICAL EXAMINATION
CONSTITUTIONAL: Well-appearing; well-nourished; in no apparent distress.   HEAD: Normocephalic; atraumatic.   EYES: PERRL; EOM intact; conjunctiva and sclera clear  ENT: normal nose; no rhinorrhea; normal pharynx with no erythema or lesions.   NECK: Supple; non-tender; no LAD  MSK: Left elbow: positive large soft swelling over olecranon with no erythema or warmth and non-tender. FROM.     EXT: No cyanosis or edema; N/V intact  SKIN: Normal for age and race; warm; dry; good turgor; no apparent lesions or rash.   NEURO: A & O x 3; face symmetric; grossly unremarkable.   PSYCHOLOGICAL: The patient’s mood and manner are appropriate.

## 2019-06-28 NOTE — ED PROVIDER NOTE - OBJECTIVE STATEMENT
62 y/o M with no significant PMHx presents to the ED with complaints of left elbow swelling with associated pain. The elbow began swelling 3-4 months ago without any pain but developed "pulsating" pain and increased swelling 3 weeks ago. The patient took some ibuprofen to manage the pain which provided relief.  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 Denies trauma, fever, chills, numbness, tingling or other acute complaints.

## 2019-06-28 NOTE — ED ADULT NURSE NOTE - CHPI ED NUR SYMPTOMS NEG
no bruising/no deformity/no difficulty bearing weight/no stiffness/no weakness/no abrasion/no back pain/no tingling/no fever/no numbness

## 2019-06-28 NOTE — ED PROVIDER NOTE - DIAGNOSTIC INTERPRETATION
ER PA: Odette Sanders  elbow xray INTERPRETATION:  no acute fracture; +soft tissue swelling noted over olecrenon; normal bony alignment.

## 2020-07-11 NOTE — ED ADULT NURSE NOTE - NS ED NOTE ABUSE SUSPICION NEGLECT YN
as above    please refer to H&P for full note.  plan for abx and timely laparoscopic appendectomy.
No

## 2021-01-26 NOTE — ED ADULT NURSE NOTE - CAS DISCH TRANSFER METHOD
Admission medication history interview status for the 1/25/2021 admission is complete. See Epic admission navigator for allergy information, pharmacy, prior to admission medications and immunization status.     Medication history interview sources:  Mother, Lena and [facility].     Changes made to PTA medication list (reason)  Added: 1  1. Nivestym [Filgrastim]  Deleted: 0  Changed: 2  1. Vitamin D, cholecalciferol. Take by mouth daily. ADJUSTED TO: Take 1,000 Units by mouth daily.   2. Sennosides 8.6 mg. Take 1 tablet by mouth  Daily. ADJUSTED TO: Take 1 tablet by mouth every other day.     Patient Medication Preference  Prefers medications come as pills. Okay with chew tabs or liquids.    Patient Medication Schedule Preference  The patient does not have a preferred timing for medications, our standard may be used    Patient Supplied Medications  The patient does not have any home medications approved for use while inpatient    Additional medication history information (including reliability of information, actions taken by pharmacist):  1. Patient's mother was a reliable historian. Obtained Filgrastim dosing, directions, and concentration from [facility].   2. Patient's mother requested refills at discharge for acetaminophen tablets, granisetron tablets and lidocaine cream.   3. Patient's mother stated that granisetron is more effective and convenient than ondansetron at managing emesis prophylaxis. Has ondansetron on hand at home in case it is needed. Patient prefers granisetron tablets over oral solution. Currently ondansetron is ordered.   4. PTA patient was not taking lactulose because it triggered vomiting immediately after intake. Mother would like to keep on list until after she observes the effects of the next round of chemo.       Prior to Admission medications    Medication Sig Last Dose Taking? Auth Provider   acetaminophen (TYLENOL) 325 MG tablet Take 1 tablet (325 mg) by mouth every 6  hours as needed for mild pain or fever 1/22/2021 at Unknown  Yes David Tabares MD   diphenhydrAMINE (BENADRYL) 25 MG capsule Take 1 capsule (25 mg) by mouth every 6 hours as needed (Breakthrough Nausea and Vomiting ) Past Week at Unknown time Yes Maia Foreman MD   Filgrastim-aafi (NIVESTYM) 300 MCG/ML SOLN Inject 144.48 mcg Subcutaneous daily Take a dose in the morning a day after chemo. Then start taking again 1-2 days before the next chemotherapy treatment based on the physician's recommendations. Past Week at AM Yes Unknown, Entered By History   granisetron (KYTRIL) 2 MG/10 ML solution Take 5 mLs (1 mg) by mouth every 12 hours 1/20/2021 at Unknown  Yes Maia Foreman MD   lidocaine-prilocaine (EMLA) 2.5-2.5 % external cream Apply topically as needed for moderate pain Apply to port site 30 minutes prior to port access. May apply topically to SubQ injection sites as well. Past Week at Unknown time Yes Dilcia Dutton APRN CNP   LORazepam (ATIVAN) 1 MG tablet Take 1-1.5 tablets (1-1.5 mg) by mouth every 6 hours as needed (Breakthrough nausea / vomiting) Past Month at Unknown time Yes Maia Foreman MD   medical cannabis (Patient's own supply) See Admin Instructions (The purpose of this order is to document that the patient reports taking medical cannabis.  This is not a prescription, and is not used to certify that the patient has a qualifying medical condition.) 1/21/2021 at Unknown  Yes Unknown, Entered By History   oxyCODONE (ROXICODONE) 5 MG/5ML solution Take 2 mLs (2 mg) by mouth every 4 hours as needed for severe pain Past Month at Unknown time Yes Dilcia Dutton APRN CNP   polyethylene glycol (MIRALAX) 17 g packet Take 17 g by mouth daily Past Week at Unknown time Yes David Tabares MD   scopolamine (TRANSDERM) 1 MG/3DAYS 72 hr patch Place 1 patch onto the skin every 72 hours Past Week at Unknown time Yes Maia Foreman  MD Justina   sennosides (SENOKOT) 8.6 MG tablet Take 1 tablet by mouth daily  Patient taking differently: Take 1 tablet by mouth every other day  1/24/2021 at AM Yes Maia Foreman MD   sulfamethoxazole-trimethoprim (BACTRIM) 400-80 MG tablet Take 1 tablet by mouth Every Mon, Tues two times daily 1/25/2021 at Unknown  Yes Isha Molina MD   Vitamin D (Cholecalciferol) 25 MCG (1000 UT) TABS Take 1,000 Units by mouth daily  1/25/2021 at Unknown time Yes Reported, Patient   Filgrastim (NEUPOGEN) 300 MCG/0.5ML SOSY syringe Inject 0.22 mLs (132 mcg) Subcutaneous daily for 10 doses Begin 24 hours after the last dose of chemotherapy is complete. Continue until goal ANC has been met.   Shakira Flaherty MD   lactulose (CHRONULAC) 10 GM/15ML solution Take 30 mLs by mouth 2 times daily as needed for constipation More than a month at Unknown time  iDlcia Dutton APRN CNP   ondansetron (ZOFRAN) 4 MG tablet Take 1 tablet (4 mg) by mouth every 6 hours as needed for nausea or vomiting Unknown at Unknown time  Gage Solano MD         Medication history completed by:   Tiana Short  Pharmacy Student Intern     Walking

## 2021-07-19 ENCOUNTER — EMERGENCY (EMERGENCY)
Facility: HOSPITAL | Age: 66
LOS: 1 days | Discharge: HOME | End: 2021-07-19
Admitting: EMERGENCY MEDICINE
Payer: COMMERCIAL

## 2021-07-19 VITALS
RESPIRATION RATE: 18 BRPM | SYSTOLIC BLOOD PRESSURE: 167 MMHG | HEART RATE: 70 BPM | OXYGEN SATURATION: 100 % | TEMPERATURE: 98 F | WEIGHT: 179.9 LBS | HEIGHT: 70 IN | DIASTOLIC BLOOD PRESSURE: 99 MMHG

## 2021-07-19 DIAGNOSIS — L02.01 CUTANEOUS ABSCESS OF FACE: ICD-10-CM

## 2021-07-19 DIAGNOSIS — R60.9 EDEMA, UNSPECIFIED: ICD-10-CM

## 2021-07-19 DIAGNOSIS — Z98.89 OTHER SPECIFIED POSTPROCEDURAL STATES: Chronic | ICD-10-CM

## 2021-07-19 PROCEDURE — 99283 EMERGENCY DEPT VISIT LOW MDM: CPT

## 2021-07-19 RX ORDER — AZTREONAM 2 G
1 VIAL (EA) INJECTION
Qty: 20 | Refills: 0
Start: 2021-07-19 | End: 2021-07-28

## 2021-07-19 RX ORDER — AZTREONAM 2 G
1 VIAL (EA) INJECTION
Qty: 20 | Refills: 0
Start: 2021-07-19 | End: 2021-08-12

## 2021-07-19 RX ADMIN — Medication 1 TABLET(S): at 18:54

## 2021-07-19 RX ADMIN — Medication 500 MILLIGRAM(S): at 18:54

## 2021-07-19 NOTE — ED PROVIDER NOTE - NSFOLLOWUPINSTRUCTIONS_ED_ALL_ED_FT
Recommend warm compress 20 mins on /off 3-4 times a day. Return to ED in three days or sooner if worsening. Avoid picking, squeezing or touching face. Finish all antibiotics.     Elevate legs when resting at home.

## 2021-07-19 NOTE — ED PROVIDER NOTE - CLINICAL SUMMARY MEDICAL DECISION MAKING FREE TEXT BOX
Patient with left facial abscess with no cellulitis, afebrile. Patient does not want to I&D at this time. He agree to tx with oral abx therapy and warm compress more frequently. Will return to ED if condition worsen or does not improve.

## 2021-07-19 NOTE — ED PROVIDER NOTE - OBJECTIVE STATEMENT
64 y/o M w/ no PMHx presents to the ED complaining of left facial cheek abscess for 1 week, worsening in the last 24 hours. Patient reports that the abscess has gotten bigger, some drainage today. Admits to picking and squeezing, does not want to have abscess drained at this time. Denies fever, facial swelling, pain w/ chewing, nausea, vomiting. Patient also reports having bilateral ankle swelling, right > left. Patient is a smoker. 64 y/o M w/ no PMHx presents to the ED complaining of left facial cheek abscess for 1 week, worsening in the last 24 hours. Patient reports that the abscess has gotten bigger,  with some drainage today. Admits to picking and squeezing it. Patient does not want to have abscess drained at this time. Denies fever, facial swelling, pain w/ chewing, nausea, vomiting. Patient also reports having bilateral ankle swelling, right > left. Patient is a smoker.

## 2021-07-19 NOTE — ED PROVIDER NOTE - MUSCULOSKELETAL, MLM
Spine appears normal, range of motion is not limited, no muscle or joint tenderness. Bilateral ankles/feet swelling, right slightly more swollen than left. No pitting edema, pulses intact, no motor or sensory deficits.

## 2021-07-19 NOTE — ED ADULT TRIAGE NOTE - CHIEF COMPLAINT QUOTE
patient with abscess to left cheek x 1 month. has been growing and today it started to drain. denies fever, chills

## 2021-07-19 NOTE — ED PROVIDER NOTE - CONSTITUTIONAL, MLM
Sitting on side of bed. normal... Well appearing, awake, alert, oriented to person, place, time/situation and in no apparent distress.

## 2021-07-19 NOTE — ED PROVIDER NOTE - PATIENT PORTAL LINK FT
You can access the FollowMyHealth Patient Portal offered by [facility] by registering at the following website: [email or web address]. By joining [facility]’s FollowMyHealth portal, you will also be able to view your health information using other applications (apps) compatible with our system.

## 2021-07-19 NOTE — ED PROVIDER NOTE - SKIN, MLM
Left cheek w/ 1.5 cm abscess mid-cheek. No surrounding cellulitis, minimally purulent discharge and some firmness. No lymphangitis. Skin normal color for race, warm, dry and intact. No evidence of rash.

## 2021-07-22 ENCOUNTER — EMERGENCY (EMERGENCY)
Facility: HOSPITAL | Age: 66
LOS: 1 days | Discharge: HOME | End: 2021-07-22
Attending: EMERGENCY MEDICINE | Admitting: EMERGENCY MEDICINE
Payer: COMMERCIAL

## 2021-07-22 VITALS
RESPIRATION RATE: 18 BRPM | HEART RATE: 82 BPM | SYSTOLIC BLOOD PRESSURE: 195 MMHG | DIASTOLIC BLOOD PRESSURE: 113 MMHG | TEMPERATURE: 99 F | HEIGHT: 70 IN | WEIGHT: 179.9 LBS | OXYGEN SATURATION: 99 %

## 2021-07-22 VITALS — SYSTOLIC BLOOD PRESSURE: 153 MMHG | DIASTOLIC BLOOD PRESSURE: 103 MMHG

## 2021-07-22 DIAGNOSIS — Z48.01 ENCOUNTER FOR CHANGE OR REMOVAL OF SURGICAL WOUND DRESSING: ICD-10-CM

## 2021-07-22 DIAGNOSIS — Z98.89 OTHER SPECIFIED POSTPROCEDURAL STATES: Chronic | ICD-10-CM

## 2021-07-22 PROCEDURE — L9995: CPT

## 2021-07-22 PROCEDURE — 99281 EMR DPT VST MAYX REQ PHY/QHP: CPT

## 2021-07-22 NOTE — ED PROVIDER NOTE - PATIENT PORTAL LINK FT
You can access the FollowMyHealth Patient Portal offered by [facility] by registering at the following website: [email or web address]. By joining [facility]’s FollowMyHealth portal, you will also be able to view your health information using other applications (apps) compatible with our system.

## 2021-07-22 NOTE — ED PROVIDER NOTE - OBJECTIVE STATEMENT
no pmh recently seen at [facility] ed for abcess I and D and was dc home on abx. pt states was told to return to ed for a wound check. pt denies fever, chills, sweats, diarrhea, vomiting. denies any sxs. states abscess has improved. is compliant with abx

## 2021-07-22 NOTE — ED ADULT NURSE NOTE - NS ED NURSE LEVEL OF CONSCIOUSNESS AFFECT
VIDEO VISIT    HISTORY:    Conchita Andrade is a 37 year old female who presents for follow-up of obesity and refill of Phentermine.  States she is feeling uncertain about how her anxiety is right now. Says she is feeling kind of \"numb\".  She reports her father passed away last week suddenly when participating in a clinical trial for his cancer.  Her son is the one who discovered him .  She states she has a lot of support from family.  Her weight has been fluctuating as she has found her appetite has been varied with her current emotional challenges.  She has lost some weight overall, but is concerned about being able to maintain the loss.  She also states that about 1-2 weeks ago she noticed a scab-like skin lesion on her forearm.  She did not have any known trauma to that area.  The area was tender to the touch for sometime, but now seems to have gone away.  She is concerned to have it evaluated as she has a family history of Melanoma.  I have reviewed the past medical, family and social history sections including the medications and allergies listed in the above medical record as well as the nursing notes.     Patient Active Problem List    Diagnosis Date Noted   • Benign essential hypertension antepartum 10/25/2016     Priority: Low   • Hypertension 2013     Priority: Low   • Depression 2013     Priority: Low   • Anxiety 2013     Priority: Low       Current Outpatient Medications   Medication Sig   • lisinopril (ZESTRIL) 20 MG tablet Take 1 tablet by mouth daily.   • etonogestrel-ethinyl estradiol (NUVARING) 0.12-0.015 MG/24HR vaginal ring INSERT 1 RING VAGINALLY FOR 3 WEEKS, THEN REMOVE FOR 1 WEEK   • phentermine 37.5 MG capsule TAKE 1 CAPSULE BY MOUTH EVERY MORNING. DO NOT. START BEFORE 2020   • labetalol (NORMODYNE) 200 MG tablet Take 1 tablet by mouth 2 times daily.   • fluticasone (FLONASE ALLERGY RELIEF) 50 MCG/ACT nasal spray Spray 2 sprays in each nostril daily.     No  current facility-administered medications for this visit.        REVIEW OF SYSTEMS;    Constitutional:  Patient denies fever, chills, tiredness or malaise.    Eyes:  Denies change in visual acuity, pain, burning or itching.    HENT:  Denies sinus problems, ear infections, nasal congestion or sore throat.    Respiratory:  Denies cough, shortness of breath.    Cardiovascular:  Denies chest pain, edema.    Gastrointestinal:  Denies abdominal pain, nausea, vomiting, bloody stools or diarrhea.    Musculoskeletal:  Denies back pain, neck pain, joint pain or leg swelling.    Integument:  Denies rash, itching.  Skin lesion on arm.  Neurologic:  Denies headache, focal weakness or sensory changes.      All other systems reviewed and negative            ASSESSMENT AND PLAN:      1.  Obesity  Continue Phentermine 37.5 mg daily as directed.      2.  Anxiety/depression  Offered, but declined anxiolytic medication.  Call clinic with concerns.    3.  Hypertension  Valsartan was stopped and replaced with Lisinopril 20 mg due to backorder of Valsartan.  Continue Lisinopril 20 mg daily and Labetalol 200 mg BID as directed.  Advised to check blood pressure at home and update office with readings.    4.  Skin lesion  Advised to schedule an appointment for skin biopsy.        This visit was performed via live interactive two-way video.    [facility]   Patient Location: Home   Garrick Garcia MD, FACP      The documentation recorded by the scribe accurately and completely reflects the service(s) I personally performed and the decisions made by me.                           Calm

## 2021-07-22 NOTE — ED PROVIDER NOTE - PHYSICAL EXAMINATION
wound: 1.5 cm in diameter circular wound, non indurated, non fluctulant. no drainage, no erythema. no rash. no bleeding. healing well. dry.

## 2021-07-22 NOTE — ED ADULT TRIAGE NOTE - CHIEF COMPLAINT QUOTE
Pt presents for re-evaluation of left cheek abscess treated in the ED on monday. Pt denies fevers at home.

## 2021-07-22 NOTE — ED PROVIDER NOTE - NSFOLLOWUPINSTRUCTIONS_ED_ALL_ED_FT
please follow up with your pmd in 1 day  please continue to take your antibiotics as prescribed.   Incision and Drainage, Care After      This sheet gives you information about how to care for yourself after your procedure. Your health care provider may also give you more specific instructions. If you have problems or questions, contact your health care provider.      What can I expect after the procedure?  After the procedure, it is common to have:  •Pain or discomfort around the incision site.      •Blood, fluid, or pus (drainage) from the incision.      •Redness and firm skin around the incision site.        Follow these instructions at home:    Medicines     •Take over-the-counter and prescription medicines only as told by your health care provider.      •If you were prescribed an antibiotic medicine, use or take it as told by your health care provider. Do not stop using the antibiotic even if you start to feel better.        Wound care   Follow instructions from your health care provider about how to take care of your wound. Make sure you:  •Wash your hands with soap and water before and after you change your bandage (dressing). If soap and water are not available, use hand .    •Change your dressing and packing as told by your health care provider.  •If your dressing is dry or stuck when you try to remove it, moisten or wet the dressing with saline or water so that it can be removed without harming your skin or tissues.      •If your wound is packed, leave it in place until your health care provider tells you to remove it. To remove the packing, moisten or wet the packing with saline or water so that it can be removed without harming your skin or tissues.        •Leave stitches (sutures), skin glue, or adhesive strips in place. These skin closures may need to stay in place for 2 weeks or longer. If adhesive strip edges start to loosen and curl up, you may trim the loose edges. Do not remove adhesive strips completely unless your health care provider tells you to do that.    Check your wound every day for signs of infection. Check for:  •More redness, swelling, or pain.      •More fluid or blood.      •Warmth.      •Pus or a bad smell.    If you were sent home with a drain tube in place, follow instructions from your health care provider about:  •How to empty it.      •How to care for it at home.      General instructions     •Rest the affected area.      • Do not take baths, swim, or use a hot tub until your health care provider approves. Ask your health care provider if you may take showers. You may only be allowed to take sponge baths.      •Return to your normal activities as told by your health care provider. Ask your health care provider what activities are safe for you. Your health care provider may put you on activity or lifting restrictions.      •The incision will continue to drain. It is normal to have some clear or slightly bloody drainage. The amount of drainage should lessen each day.      • Do not apply any creams, ointments, or liquids unless you have been told to by your health care provider.      •Keep all follow-up visits as told by your health care provider. This is important.        Contact a health care provider if:    •Your cyst or abscess returns.      •You have a fever or chills.      •You have more redness, swelling, or pain around your incision.      •You have more fluid or blood coming from your incision.      •Your incision feels warm to the touch.      •You have pus or a bad smell coming from your incision.      •You have red streaks above or below the incision site.        Get help right away if:    •You have severe pain or bleeding.      •You cannot eat or drink without vomiting.      •You have decreased urine output.      •You become short of breath.      •You have chest pain.      •You cough up blood.      •The affected area becomes numb or starts to tingle.      These symptoms may represent a serious problem that is an emergency. Do not wait to see if the symptoms will go away. Get medical help right away. Call your local emergency services (911 in the [place] Do not drive yourself to the hospital.       Summary    •After this procedure, it is common to have fluid, blood, or pus coming from the surgery site.      •Follow all home care instructions. You will be told how to take care of your incision, how to check for infection, and how to take medicines.      •If you were prescribed an antibiotic medicine, take it as told by your health care provider. Do not stop taking the antibiotic even if you start to feel better.      •Contact a health care provider if you have increased redness, swelling, or pain around your incision. Get help right away if you have chest pain, you vomit, you cough up blood, or you have shortness of breath.      •Keep all follow-up visits as told by your health care provider. This is important.      This information is not intended to replace advice given to you by your health care provider. Make sure you discuss any questions you have with your health care provider.

## 2021-07-22 NOTE — ED ADULT NURSE NOTE - OBJECTIVE STATEMENT
64 y/o male presents to ED for wound check, seen in ED on Monday and had I&D of abscess. Denies fevers at home, has been taking abx as prescribed.

## 2021-08-02 ENCOUNTER — EMERGENCY (EMERGENCY)
Facility: HOSPITAL | Age: 66
LOS: 1 days | Discharge: HOME | End: 2021-08-02
Attending: EMERGENCY MEDICINE | Admitting: EMERGENCY MEDICINE
Payer: COMMERCIAL

## 2021-08-02 VITALS
SYSTOLIC BLOOD PRESSURE: 159 MMHG | TEMPERATURE: 99 F | HEIGHT: 70 IN | RESPIRATION RATE: 16 BRPM | HEART RATE: 72 BPM | DIASTOLIC BLOOD PRESSURE: 95 MMHG | WEIGHT: 160.06 LBS

## 2021-08-02 DIAGNOSIS — M79.671 PAIN IN RIGHT FOOT: ICD-10-CM

## 2021-08-02 DIAGNOSIS — Z98.89 OTHER SPECIFIED POSTPROCEDURAL STATES: Chronic | ICD-10-CM

## 2021-08-02 DIAGNOSIS — L02.01 CUTANEOUS ABSCESS OF FACE: ICD-10-CM

## 2021-08-02 PROCEDURE — 99283 EMERGENCY DEPT VISIT LOW MDM: CPT

## 2021-08-02 NOTE — ED ADULT NURSE NOTE - OBJECTIVE STATEMENT
s/p I & D to left cheek abscess on July 22, returns today for wound check. s/p I & D to left cheek abscess on July 22, returns today for wound check.  No pain, discharge or tenderness to wound, no fever or chills.  Patient also c/o of new onset right foot pain, denies any injury or fall,  states wants stronger pain med than Ibuprofen.  Able to bear weight and ambulate w/out any difficulty.

## 2021-08-03 VITALS
SYSTOLIC BLOOD PRESSURE: 150 MMHG | RESPIRATION RATE: 17 BRPM | OXYGEN SATURATION: 99 % | HEART RATE: 70 BPM | DIASTOLIC BLOOD PRESSURE: 98 MMHG

## 2021-08-03 PROCEDURE — 99283 EMERGENCY DEPT VISIT LOW MDM: CPT

## 2021-08-03 RX ORDER — ACETAMINOPHEN 500 MG
975 TABLET ORAL ONCE
Refills: 0 | Status: COMPLETED | OUTPATIENT
Start: 2021-08-03 | End: 2021-08-03

## 2021-08-03 RX ADMIN — Medication 975 MILLIGRAM(S): at 00:25

## 2021-08-03 RX ADMIN — Medication 1 TABLET(S): at 00:25

## 2021-08-03 NOTE — ED PROVIDER NOTE - PATIENT PORTAL LINK FT
You can access the FollowMyHealth Patient Portal offered by [facility] by registering at the following website: [email or web address]. By joining [facility]’s FollowMyHealth portal, you will also be able to view your health information using other applications (apps) compatible with our system.

## 2021-08-03 NOTE — ED PROVIDER NOTE - CLINICAL SUMMARY MEDICAL DECISION MAKING FREE TEXT BOX
Pt c/o ongoing swelling L cheek and R foot.  Pt w swelling w/o fluctuance, erythema, warmth on cheek - ? early abscess vs resolving abscess (suspect resolving, no exam findings to suggest need for I and D at this time).  Pt also c/o resolving R foot swelling w mild swelling w/o findings to suggest cellulitis now, ? cord from superficial thrombophlebitis (no warmth, erythema) vs resolving hematoma dorsum foot  Pt refused doppler and xray but agreed to fu outpt w podiatry.  Will treat w pain meds and abx given ? early vs resolving abscess, discussed warm compresses on face and topical abx.

## 2021-08-03 NOTE — ED PROVIDER NOTE - NSFOLLOWUPINSTRUCTIONS_ED_ALL_ED_FT
Facial abscess (improving)  Foot pain    Please follow up with a podiatrist.  Use naproxen as prescribed for pain as needed.      Use warm soaks on your face.  Take bactrim twice a day.  Return for increased pain, redness/swelling/drainage, fever, any other concerns.    Abscess    An abscess is an infected area that contains a collection of pus and debris. It can occur in almost any part of the body and occurs when the tissue gets infection. Symptoms include a painful mass that is red, warm, tender that might break open and HAVE drainage. If your health care provider gave you antibiotics make sure to take the full course and do not stop even if feeling better.     SEEK IMMEDIATE MEDICAL CARE IF YOU HAVE ANY OF THE FOLLOWING SYMPTOMS: chills, fever, muscle aches, or red streaking from the area.

## 2021-08-03 NOTE — ED PROVIDER NOTE - MUSCULOSKELETAL, MLM
Spine appears normal, range of motion is not limited, no muscle or joint tenderness, no c/c/ttp bilat le, mild R food swelling on dorsum of foot w/o erythema, warmth, ttp, poss hematoma vs cord on dorsum of foot (pt reports swelling much improved to prior), dp 1+ bilat, LLE nl, no bony ttp R toes/foot/ankle/lower leg

## 2021-08-03 NOTE — ED PROVIDER NOTE - ENMT, MLM
Airway patent, Nasal mucosa clear. Mouth with normal mucosa. Throat has no vesicles, no oropharyngeal exudates and uvula is midline. L cheek w 1.5 cm area of induration w/o fluctuance, erythema, warmth, + mild ttp

## 2021-08-03 NOTE — ED PROVIDER NOTE - OBJECTIVE STATEMENT
66 yo male returns for wound check and requesting abx  Pt seen in July for facial abscess, s/p I and D, given bactrim.  Pt feels his L cheek is much improved but still has some residual swelling w/o sig drainage, pain, fever, redness.  Pt also states he thought the abx helped his foot swelling - present x 3-4 wk w/o known trauma, no current redness, + mild swelling, no pain.  No h/o dvt.  Pt requesting additional days of abx.

## 2023-05-26 NOTE — ED PROVIDER NOTE - OBJECTIVE STATEMENT
pt without significant PMHx c/o tender lump to right side of face.  pt states he thought it was a pimple when it started about 6 weeks ago.  he was able to squeeze a drop of pus out of it a few weeks ago.  he says there was a small amount of drainage from the ear canal a few weeks ago.  no fever or chills, no headaches, no hearing problems, no dental pain or sore throat.   he went to the nurse at his residence today who advised him to go to the ER  for evaluation.  he is requesting that we drain it for him, otherwise he plans to do it himself with a pin.
General

## 2024-02-21 NOTE — ED ADULT TRIAGE NOTE - CHIEF COMPLAINT QUOTE
Patient states he was here on Thursday and had an abscess drained from his right armpit.  Patient denies any fevers, chills, N/V or any other complaints at this time.
no